# Patient Record
Sex: FEMALE | Race: WHITE | NOT HISPANIC OR LATINO | Employment: PART TIME | URBAN - METROPOLITAN AREA
[De-identification: names, ages, dates, MRNs, and addresses within clinical notes are randomized per-mention and may not be internally consistent; named-entity substitution may affect disease eponyms.]

---

## 2024-09-05 ENCOUNTER — OFFICE VISIT (OUTPATIENT)
Dept: URGENT CARE | Age: 34
End: 2024-09-05
Payer: COMMERCIAL

## 2024-09-05 VITALS
TEMPERATURE: 98.7 F | HEIGHT: 64 IN | WEIGHT: 194.2 LBS | OXYGEN SATURATION: 98 % | BODY MASS INDEX: 33.16 KG/M2 | HEART RATE: 94 BPM | RESPIRATION RATE: 20 BRPM

## 2024-09-05 DIAGNOSIS — S61.411A LACERATION OF RIGHT HAND WITHOUT FOREIGN BODY, INITIAL ENCOUNTER: Primary | ICD-10-CM

## 2024-09-05 PROCEDURE — G0382 LEV 3 HOSP TYPE B ED VISIT: HCPCS | Performed by: EMERGENCY MEDICINE

## 2024-09-05 RX ORDER — ARIPIPRAZOLE 10 MG/1
10 TABLET ORAL DAILY
COMMUNITY

## 2024-09-05 RX ORDER — ESCITALOPRAM OXALATE 20 MG/1
20 TABLET ORAL DAILY
COMMUNITY

## 2024-09-05 NOTE — PROGRESS NOTES
[facility]        NAME: Galina Gracia is a 34 y.o. female  : 1990    MRN: [id number]  DATE: 2024  TIME: 5:17 PM    Assessment and Plan   Laceration of right hand without foreign body, initial encounter [S61.411A]  1. Laceration of right hand without foreign body, initial encounter          Right hand avulsion lac- was going down stairs and cute right palm on nail- TDAP UTD- bleeding controlled-  cleansed, steri-stripped- dressed    Patient Instructions       Follow up with PCP as needed  If tests have been performed at [facility], our office will contact you with results if changes need to be made to the care plan discussed with you at the visit.  You can review your full results on [facility].    Chief Complaint     Chief Complaint   Patient presents with    Laceration     Tripped walking up steps about one hour ago and caught herself, but hand was cut by a nail. Still slightly bleeding.          History of Present Illness       Right hand avulsion lac- was going down stairs and cute right palm on nail- TDAP UTD- bleeding controlled-  cleansed, steri-stripped- dressed    Laceration         Review of Systems   Review of Systems   Skin:  Positive for wound.         Current Medications       Current Outpatient Medications:     ARIPiprazole (ABILIFY) 10 mg tablet, Take 10 mg by mouth daily, Disp: , Rfl:     escitalopram (LEXAPRO) 20 mg tablet, Take 20 mg by mouth daily, Disp: , Rfl:     Current Allergies     Allergies as of 2024    (No Known Allergies)            The following portions of the patient's history were reviewed and updated as appropriate: allergies, current medications, past family history, past medical history, past social history, past surgical history and problem list.     History reviewed. No pertinent past medical history.    Past Surgical History:   Procedure Laterality Date     SECTION       and 2016    CHOLECYSTECTOMY  2017       Family  "History   Problem Relation Age of Onset    Diabetes Mother     Diabetes Father          Medications have been verified.        Objective   Pulse 94   Temp 98.7 °F (37.1 °C)   Resp 20   Ht 5' 4\" (1.626 m)   Wt 88.1 kg (194 lb 3.2 oz)   SpO2 98%   BMI 33.33 kg/m²   No LMP recorded.       Physical Exam     Physical Exam  Vitals reviewed.   Skin:     Findings: Laceration present.      Comments: Avulsion lac- right palm                   "